# Patient Record
(demographics unavailable — no encounter records)

---

## 2024-10-22 NOTE — HISTORY OF PRESENT ILLNESS
[de-identified] : Patient presents for LT knee pain evaluation. Patient states that she has been having intermittent pain in her LT knee for 2 months but since Wednesday, 10/16/2024 the pain has been constant. Patient has medial knee pain, especially when she stands up and begins walking. Patient states that she doesn't have pain while sitting down or during sleep.

## 2024-10-22 NOTE — PROCEDURE
[Tendon Sheath] : tendon sheath [Left] : of the left [Pas anserinus sheath] : pas anserinus sheath [Pain] : pain [Inflammation] : inflammation [Alcohol] : alcohol [Betadine] : betadine [Ethyl Chloride sprayed topically] : ethyl chloride sprayed topically [Sterile technique used] : sterile technique used [___ cc    6mg] :  Betamethasone (Celestone) ~Vcc of 6mg [___ cc    1%] : Lidocaine ~Vcc of 1%  [] : Patient tolerated procedure well [Call if redness, pain or fever occur] : call if redness, pain or fever occur [Apply ice for 15min out of every hour for the next 12-24 hours as tolerated] : apply ice for 15 minutes out of every hour for the next 12-24 hours as tolerated [Patient was advised to rest the joint(s) for ____ days] : patient was advised to rest the joint(s) for [unfilled] days [Previous OTC use and PT nontherapeutic] : patient has tried OTC's including aspirin, Ibuprofen, Aleve, etc or prescription NSAIDS, and/or exercises at home and/or physical therapy without satisfactory response [Patient had decreased mobility in the joint] : patient had decreased mobility in the joint [Risks, benefits, alternatives discussed / Verbal consent obtained] : the risks benefits, and alternatives have been discussed, and verbal consent was obtained

## 2024-10-22 NOTE — PHYSICAL EXAM
[NL (140)] : flexion 140 degrees [NL (0)] : extension 0 degrees [5___] : hamstring 5[unfilled]/5 [] : patient ambulates without assistive device [Left] : left knee [AP] : anteroposterior [Lateral] : lateral [[place]] : skyline [There are no fractures, subluxations or dislocations. No significant abnormalities are seen] : There are no fractures, subluxations or dislocations. No significant abnormalities are seen

## 2024-10-22 NOTE — DISCUSSION/SUMMARY
[de-identified] : I reviewed patient's radiographs and discussed her condition and treatment options.  She tolerated injection well.  Start PT and HEP.  Follow up in 6 weeks.  Patient voiced understanding and agreement with the plan.

## 2024-10-30 NOTE — HISTORY OF PRESENT ILLNESS
[de-identified] : Patient is following up on LT knee pain. Patient had injection last visit, 10/22/2024 and states it didn't help much with her pain. Patient states that since her gait is changed that her right shoulder is hurting. Patient had oxycodone prescribed by her friend that is an NP and states she only took it 2 nights without significant relief and only constipation as side effect.

## 2024-10-30 NOTE — DISCUSSION/SUMMARY
[de-identified] : I reviewed patient's prior left knee radiographs and discussed her condition and treatment options.  Given continued and worsening pain after injection, I advised further imaging of the knee and ordered MRI of the left knee without contrast.  Patient voiced understanding and agreement with the plan.

## 2024-10-30 NOTE — PHYSICAL EXAM
[NL (140)] : flexion 140 degrees [NL (0)] : extension 0 degrees [5___] : hamstring 5[unfilled]/5 [Left] : left knee [AP] : anteroposterior [Lateral] : lateral [[place]] : skyline [There are no fractures, subluxations or dislocations. No significant abnormalities are seen] : There are no fractures, subluxations or dislocations. No significant abnormalities are seen [] : antalgic [FreeTextEntry3] : ecchymosis over injection site

## 2024-11-01 NOTE — DATA REVIEWED
[MRI] : MRI [Left] : left [Knee] : knee [Report was reviewed and noted in the chart] : The report was reviewed and noted in the chart [I reviewed the films/CD and agree] : I reviewed the films/CD and agree [FreeTextEntry1] : Medial meniscal tear, MCL sprain at the femur, hamstring and gastrocnemius tendinopathy, Cartilage loss, ruptured Baker's cyst.

## 2024-11-01 NOTE — DISCUSSION/SUMMARY
[de-identified] : I reviewed patient's left knee MRI and discussed her condition and treatment options.  She tolerated injection well today.  Follow up in 4-6 weeks.  Patient voiced understanding and agreement with the plan.

## 2024-11-01 NOTE — PHYSICAL EXAM
[NL (140)] : flexion 140 degrees [NL (0)] : extension 0 degrees [5___] : hamstring 5[unfilled]/5 [Left] : left knee [AP] : anteroposterior [Lateral] : lateral [[place]] : skyline [There are no fractures, subluxations or dislocations. No significant abnormalities are seen] : There are no fractures, subluxations or dislocations. No significant abnormalities are seen [] : mildly antalgic [FreeTextEntry3] : ecchymosis over injection site

## 2024-11-01 NOTE — PROCEDURE
[Large Joint Injection] : Large joint injection [Left] : of the left [Knee] : knee [Pain] : pain [Inflammation] : inflammation [Alcohol] : alcohol [Betadine] : betadine [Ethyl Chloride sprayed topically] : ethyl chloride sprayed topically [Sterile technique used] : sterile technique used [___ cc    6mg] :  Betamethasone (Celestone) ~Vcc of 6mg [___ cc    0.25%] : Bupivacaine (Marcaine) ~Vcc of 0.25%  [] : Patient tolerated procedure well [Call if redness, pain or fever occur] : call if redness, pain or fever occur [Apply ice for 15min out of every hour for the next 12-24 hours as tolerated] : apply ice for 15 minutes out of every hour for the next 12-24 hours as tolerated [Patient was advised to rest the joint(s) for ____ days] : patient was advised to rest the joint(s) for [unfilled] days [Previous OTC use and PT nontherapeutic] : patient has tried OTC's including aspirin, Ibuprofen, Aleve, etc or prescription NSAIDS, and/or exercises at home and/or physical therapy without satisfactory response [Patient had decreased mobility in the joint] : patient had decreased mobility in the joint [Risks, benefits, alternatives discussed / Verbal consent obtained] : the risks benefits, and alternatives have been discussed, and verbal consent was obtained

## 2024-11-25 NOTE — HISTORY OF PRESENT ILLNESS
[de-identified] : Body Part: right shoulder / neck evaluation Length of symptoms/ DOI: 2024 Cause of accident/ injury: NKI Radicular symptoms: pain is located in her right scapula, has weakness in her right hand, denies numbness/tingling Quality of pain: patient unable to answer this question Pain at Rest:  5/10 Pain with activity/ walkin/10 Pain worsens with: sleeping, exercising Pain improves with: PT, Advil, Tylenol Previous treatments: Dr. Pineda- had right shoulder x-rays and was referred to spine specialist Recent Imaging: right shoulder x-rays at [facility], cervical spine MRI at [facility] Current treatments: PT 2x a week at [facility] in [place] Current medications for pain: Advil and Tylenol Work status/ occupation: retired Assisted walking device: none

## 2024-11-25 NOTE — REASON FOR VISIT
[FreeTextEntry2] : right shoulder pain since 9/2024 with NKI, also here for neck evaluation per Dr. Pineda

## 2024-11-25 NOTE — ASSESSMENT
[FreeTextEntry1] : 11/2024 MRI of C-Spine was reviewed today and is as follows:  DDD C4-5, C5-6 Mild right foraminal stenosis C4-5 Mild right foraminal stenosis C5-6  81 yo female presents today for eval of her neck pain. MRI above with stenosis primarily on the right side. Patient with RUE radicular symptoms. Recommend PT and MUNIR for relief. Imaging not indicative of need for surgical management.   - Referral to Dr. Victoria for cervical MUNIR   - Recommend physical therapy to regain range of motion, strengthening and symptomatic improvement. Prescription given in office today.   - Patient given prescription for Robaxin 750mg today. Advised patient that medication may make them drowsy, start by taking it at night.   - Patient will begin Medrol.  Patient advised not to take any NSAIDs while taking the steroids.   - Recommend NSAIDs PRN - Recommend heating pad use to decrease muscle spasm - Discussed the importance of home exercises, including but not limited to neck stretching and cervical traction   Patient was educated on their diagnosis today. All questions answered and patient expressed understanding.  Follow up PRN

## 2024-11-25 NOTE — DATA REVIEWED
[MRI] : MRI [Cervical Spine] : cervical spine [I independently reviewed and interpreted images and report] : I independently reviewed and interpreted images and report [FreeTextEntry1] : 11/2024 MRI of C-Spine was reviewed today and is as follows:  DDD C4-5, C5-6 Mild right foraminal stenosis C4-5 Mild right foraminal stenosis C5-6

## 2024-12-13 NOTE — HISTORY OF PRESENT ILLNESS
[de-identified] : Patient is following up on LT knee pain. Patient states that she didn't have any relief from the injection last visit. Patient states that she does get some relief from consistent PT though.

## 2024-12-13 NOTE — DISCUSSION/SUMMARY
[de-identified] : I discussed her condition and treatment course.  Continue PT and HEP.  Follow up as needed, possibly upon return from [place] in 4 months.  Patient voiced understanding and agreement with the plan.

## 2024-12-13 NOTE — PHYSICAL EXAM
[NL (140)] : flexion 140 degrees [NL (0)] : extension 0 degrees [5___] : hamstring 5[unfilled]/5 [Left] : left knee [AP] : anteroposterior [Lateral] : lateral [[place]] : skyline [There are no fractures, subluxations or dislocations. No significant abnormalities are seen] : There are no fractures, subluxations or dislocations. No significant abnormalities are seen [] : MCL tenderness

## 2025-04-25 NOTE — DISCUSSION/SUMMARY
[de-identified] : I discussed her condition and treatment course.  Start course of PT and HEP.  Follow up in 4-6 weeks.  Patient voiced understanding and agreement with the plan.

## 2025-04-25 NOTE — PHYSICAL EXAM
[Left] : left knee [NL (140)] : flexion 140 degrees [NL (0)] : extension 0 degrees [5___] : hamstring 5[unfilled]/5 [] : no medial joint line tenderness

## 2025-04-25 NOTE — HISTORY OF PRESENT ILLNESS
[de-identified] : Patient is following up on LT knee pain. Patient had done great in PT and then went to [place] and had gel injections without relief. Patient came back to [place] last week and the pain came back after a long walk.

## 2025-05-23 NOTE — DISCUSSION/SUMMARY
[de-identified] : I discussed her condition and treatment course.  Continue PT and HEP.  Follow up in 6 weeks or as needed.  Patient voiced understanding and agreement with the plan.